# Patient Record
Sex: MALE | Race: WHITE | Employment: OTHER | URBAN - METROPOLITAN AREA
[De-identification: names, ages, dates, MRNs, and addresses within clinical notes are randomized per-mention and may not be internally consistent; named-entity substitution may affect disease eponyms.]

---

## 2020-02-18 ENCOUNTER — OFFICE VISIT (OUTPATIENT)
Dept: RHEUMATOLOGY | Age: 30
End: 2020-02-18

## 2020-02-18 VITALS
OXYGEN SATURATION: 97 % | BODY MASS INDEX: 24.26 KG/M2 | SYSTOLIC BLOOD PRESSURE: 130 MMHG | HEART RATE: 78 BPM | TEMPERATURE: 98.6 F | DIASTOLIC BLOOD PRESSURE: 72 MMHG | HEIGHT: 69 IN | WEIGHT: 163.8 LBS

## 2020-02-18 DIAGNOSIS — M05.9 SEROPOSITIVE RHEUMATOID ARTHRITIS (HCC): Primary | ICD-10-CM

## 2020-02-18 DIAGNOSIS — Z79.60 LONG-TERM USE OF IMMUNOSUPPRESSANT MEDICATION: ICD-10-CM

## 2020-02-18 DIAGNOSIS — M05.79 RHEUMATOID ARTHRITIS INVOLVING MULTIPLE SITES WITH POSITIVE RHEUMATOID FACTOR (HCC): ICD-10-CM

## 2020-02-18 RX ORDER — LANOLIN ALCOHOL/MO/W.PET/CERES
CREAM (GRAM) TOPICAL
COMMUNITY

## 2020-02-18 RX ORDER — PREDNISONE 10 MG/1
10 TABLET ORAL DAILY
Qty: 30 TAB | Refills: 1 | Status: SHIPPED | OUTPATIENT
Start: 2020-02-18 | End: 2020-03-31 | Stop reason: SDUPTHER

## 2020-02-18 NOTE — PROGRESS NOTES
REASON FOR VISIT    This is the initial evaluation for Mr. Vinny Granado a 26 y.o.  male for question of joint pains/positive RF. The patient is referred to the [facility] at the request of Dr. Brett Emerson. HISTORY OF PRESENT ILLNESS     Previous medical records reviewed and summarized: yes    MHAQ: 0.375  Pain scale: 8/10    This is a 26 y.o. male. Patient notes that 1.5 years ago he had pain in joints. About 9 months after that he went to a doctor who told her he had rheumatoid arthritis. He had joint pain in his knees, ankles, elbows, shoulders, fingers, hips, neck. The pain came on gradually. The pain come and goes. He doesn't think activity brings on the pain. He is very active but he doesn't always has pain. If the pain is going to come on then the mornings are worse. He was given prednisone and that helped with his symptoms. He wasn't using anything before. Before the prednisone, he had swelling. He has had PIP swelling which lasted for month. He used to get swelling in toes, wrists and it would last for few days then resolve on its own. Morning stiffness all the time. Prednisone didn't help with the stiffness. He is on prednisone 10 mg for 2.5 months. Since being on prednisone, he hasn't had joint pain or swelling. IN the morning he is very fatigued. NO skin rashes. No eye issues  No diarrhea, blood in stool      REVIEW OF SYSTEMS    A 15 point review of systems was performed and summarized below. The questionnaire was reviewed with the patient and scanned into the patient's medical record.     General: denies recent weight gain, recent weight loss, fatigue, weakness, fever, drenching night sweats  Musculoskeletal: endorses  joint pain, joint swelling, morning stiffness (lasting 3 hours),denies muscle pain  Ears: denies ringing in ears, hearing loss, deafness  Eyes: denies pain, light sensitive, redness, blindness, double vision, blurred vision, excess tearing, dryness, foreign body sensation  Mouth: denies sore tongue, oral ulcers, loss of taste, dryness, increased dental caries  Nose: denies nosebleeds, nasal ulcers  Throat: denies food stuck when swallowing, difficulty with swallowing, hoarseness, pain in jaw while chewing  Neck: denies swollen glands, tender glands  Cardiopulmonary: denies pain in chest with deep breaths, pain in chest when lying down, murmurs, sudden changes in heart beat, wheezing, dry cough, productive cough, shortness of breath at rest, shortness of breath on exertion, coughing of blood  Gastrointestinal: denies nausea, heartburn, stomach pain relieved by food, chronic constipation, chronic diarrhea, blood in stools, black stools  Genitourinary: denies vaginal dryness, pain or burning on urination, blood in urine, cloudy urine, vaginal ulcers, penile ulcers  Hematologic: denies anemia, bleeding tendency, blood clots, bleeding gums  Skin: denies easy bruising, hair loss, rash, rash worsened after sun exposure, hives/urticaria, skin thickening, skin tightness, nodules/bumps, color changes of hands or feet in the cold (Raynaud's)  Neurologic: denies numbness or tingling in hands, numbness or tingling in feet, muscle weakness  Psychiatric: denies depression, excessive worries, PTSD, Bipolar  Sleep: denies poor sleep (7 hours), denies snoring, apnea, daytime somnolence, difficulty falling asleep, difficulty staying asleep     PAST MEDICAL HISTORY    Past Medical History:   Diagnosis Date    Rheumatoid arthritis ([facility] [address]         Past Surgical History:   Procedure Laterality Date    HX SKIN BIOPSY      cyst from behind right ear       FAMILY HISTORY    Family History   Problem Relation Age of Onset    No Known Problems Mother     No Known Problems Father     No Known Problems Sister     No Known Problems Brother     No Known Problems Sister     No Known Problems Sister     No Known Problems Brother     No Known Problems Brother SOCIAL HISTORY    Social History     Tobacco Use    Smoking status: Heavy Tobacco Smoker     Packs/day: 1.00     Years: 8.00     Pack years: 8.00     Types: Cigarettes    Smokeless tobacco: Never Used   Substance Use Topics    Alcohol use: Yes     Comment: rarely    Drug use: Not Currently     Comment: Former heavy Cocaine use       MEDICATIONS    Current Outpatient Medications   Medication Sig Dispense Refill    melatonin 3 mg tablet Take  by mouth.  predniSONE (DELTASONE) 10 mg tablet Take 10 mg by mouth daily. 30 Tab 1       ALLERGIES    No Known Allergies    PHYSICAL EXAMINATION    Visit Vitals  /72 (BP 1 Location: Right arm, BP Patient Position: Sitting)   Pulse 78   Temp 98.6 °F (37 °C) (Oral)   Ht 5' 9\" (1.753 m)   Wt 163 lb 12.8 oz (74.3 kg)   SpO2 97%   BMI 24.19 kg/m²     Body mass index is 24.19 kg/m². General: NAD  HEENT: PERRL, anicteric, non-injected sclerae; oropharynx without ulcers, erythema, or exudate. Moist mucous membranes. Lymphatic: No cervical or axillary lymphadenopathy. Cardiovascular: S1, S2,no R/M/G  Pulmonary: CTA b/l. No wheezes/rales/rhonchi. Abdominal: Soft,NTND, + BS. Skin: No rash, nodules, or periungual changes. Neuro: Alert; able to carry normal conversation    Musculoskeletal:   No swollen joints    DATA REVIEW    Prior medical records were reviewed and if applicable are summarized as below:    Labs:   11/2019: Rf, CCP positive, uric acid normal, CRP, CK, Esr, JULI, lyme negative    Imaging:   N/A    ASSESSMENT AND PLAN    A 26 y.o. male presents for evaluation of episodic joint pain and swelling. Patient has positive serologies for rheumatoid arthritis. 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      # Seropositive rheumatoid arthritis:  - will do b/l hand and foot x-rays  - continue prednisone 10 mg oral daily for now  - once I have blood work results, will send in methotrexate. # Medication Toxicity Monitoring:  - cbc, cmp every 3 months  - Hepatitis B, C: Ordered today  - Quant gold: Ordered today  - Immunizations: We discussed receiving influenza, Prevar-13, and Pneumovax-23 vaccines as per the CDC guidelines for immunosuppressed patients. - bone health: Discuss at next visit    RTC in 6 weeks    The patient voiced understanding of the aforementioned assessment and plan. Summary of plan was provided in the After Visit Summary patient instructions. I also provided education about MyChart setup and utility. Mr. Ele Snyder has a reminder for a \"due or due soon\" health maintenance. I have asked that he contact his primary care provider for follow-up on this health maintenance.     TODAY'S ORDERS    Orders Placed This Encounter    QUANTIFERON-TB GOLD PLUS    XR HAND RT MIN 3 V    XR HAND LT MIN 3 V    XR FOOT LT MIN 3 V    XR FOOT RT MIN 3 V    CBC WITH AUTOMATED DIFF    METABOLIC PANEL, COMPREHENSIVE    CHRONIC HEPATITIS PANEL    melatonin 3 mg tablet    predniSONE (DELTASONE) 10 mg tablet       Future Appointments   Date Time Provider Danny Mann   3/31/2020  8:00 AM Tierra Arrington  Shauna Kenney MD    Adult Rheumatology   [facility]  A Part of [address], [address] [place] [address] Road   Phone [phone]  Fax [phone]

## 2020-02-18 NOTE — PATIENT INSTRUCTIONS
Please fill out your [address] you will receive after your visit in the mail or via [address]

## 2020-02-21 LAB
ALBUMIN SERPL-MCNC: 4.6 G/DL (ref 4.1–5.2)
ALBUMIN/GLOB SERPL: 2.6 {RATIO} (ref 1.2–2.2)
ALP SERPL-CCNC: 41 IU/L (ref 39–117)
ALT SERPL-CCNC: 12 IU/L (ref 0–44)
AST SERPL-CCNC: 12 IU/L (ref 0–40)
BASOPHILS # BLD AUTO: 0.1 X10E3/UL (ref 0–0.2)
BASOPHILS NFR BLD AUTO: 1 %
BILIRUB SERPL-MCNC: 0.6 MG/DL (ref 0–1.2)
BUN SERPL-MCNC: 6 MG/DL (ref 6–20)
BUN/CREAT SERPL: 7 (ref 9–20)
CALCIUM SERPL-MCNC: 9.1 MG/DL (ref 8.7–10.2)
CHLORIDE SERPL-SCNC: 101 MMOL/L (ref 96–106)
CO2 SERPL-SCNC: 25 MMOL/L (ref 20–29)
COMMENT, 144067: NORMAL
CREAT SERPL-MCNC: 0.83 MG/DL (ref 0.76–1.27)
EOSINOPHIL # BLD AUTO: 0 X10E3/UL (ref 0–0.4)
EOSINOPHIL NFR BLD AUTO: 1 %
ERYTHROCYTE [DISTWIDTH] IN BLOOD BY AUTOMATED COUNT: 13 % (ref 11.6–15.4)
GAMMA INTERFERON BACKGROUND BLD IA-ACNC: 0.01 IU/ML
GLOBULIN SER CALC-MCNC: 1.8 G/DL (ref 1.5–4.5)
GLUCOSE SERPL-MCNC: 96 MG/DL (ref 65–99)
HBV CORE AB SERPL QL IA: NEGATIVE
HBV CORE IGM SERPL QL IA: NEGATIVE
HBV E AB SERPL QL IA: NEGATIVE
HBV E AG SERPL QL IA: NEGATIVE
HBV SURFACE AB SER QL: NON REACTIVE
HBV SURFACE AG SERPL QL IA: NEGATIVE
HCT VFR BLD AUTO: 39.6 % (ref 37.5–51)
HCV AB S/CO SERPL IA: <0.1 S/CO RATIO (ref 0–0.9)
HGB BLD-MCNC: 14.5 G/DL (ref 13–17.7)
IMM GRANULOCYTES # BLD AUTO: 0 X10E3/UL (ref 0–0.1)
IMM GRANULOCYTES NFR BLD AUTO: 0 %
LYMPHOCYTES # BLD AUTO: 0.8 X10E3/UL (ref 0.7–3.1)
LYMPHOCYTES NFR BLD AUTO: 13 %
M TB IFN-G BLD-IMP: NEGATIVE
M TB IFN-G CD4+ BCKGRND COR BLD-ACNC: 0.02 IU/ML
MCH RBC QN AUTO: 32.3 PG (ref 26.6–33)
MCHC RBC AUTO-ENTMCNC: 36.6 G/DL (ref 31.5–35.7)
MCV RBC AUTO: 88 FL (ref 79–97)
MITOGEN IGNF BLD-ACNC: 4.01 IU/ML
MONOCYTES # BLD AUTO: 0.4 X10E3/UL (ref 0.1–0.9)
MONOCYTES NFR BLD AUTO: 6 %
MORPHOLOGY BLD-IMP: ABNORMAL
NEUTROPHILS # BLD AUTO: 5 X10E3/UL (ref 1.4–7)
NEUTROPHILS NFR BLD AUTO: 79 %
PLATELET # BLD AUTO: 198 X10E3/UL (ref 150–450)
POTASSIUM SERPL-SCNC: 4.7 MMOL/L (ref 3.5–5.2)
PROT SERPL-MCNC: 6.4 G/DL (ref 6–8.5)
QUANTIFERON INCUBATION, QF1T: NORMAL
QUANTIFERON TB2 AG: 0.01 IU/ML
RBC # BLD AUTO: 4.49 X10E6/UL (ref 4.14–5.8)
SERVICE CMNT-IMP: NORMAL
SODIUM SERPL-SCNC: 141 MMOL/L (ref 134–144)
WBC # BLD AUTO: 6.3 X10E3/UL (ref 3.4–10.8)

## 2020-02-24 RX ORDER — METHOTREXATE 2.5 MG/1
15 TABLET ORAL
Qty: 30 TAB | Refills: 3 | Status: SHIPPED | OUTPATIENT
Start: 2020-02-29 | End: 2020-03-31 | Stop reason: SDUPTHER

## 2020-02-24 RX ORDER — FOLIC ACID 1 MG/1
1 TABLET ORAL DAILY
Qty: 90 TAB | Refills: 0 | Status: SHIPPED | OUTPATIENT
Start: 2020-02-24 | End: 2020-05-27 | Stop reason: SDUPTHER

## 2020-02-28 ENCOUNTER — TELEPHONE (OUTPATIENT)
Dept: RHEUMATOLOGY | Age: 30
End: 2020-02-28

## 2020-02-28 NOTE — TELEPHONE ENCOUNTER
Spoke with wife. Was concern that patient didn't have prednisone rx. . call placed to [address] [facility] states rx was there but it was too soon to fill. States patient could  later today or tomorrow. Wife notified.

## 2020-03-27 ENCOUNTER — PATIENT MESSAGE (OUTPATIENT)
Dept: RHEUMATOLOGY | Age: 30
End: 2020-03-27

## 2020-03-31 ENCOUNTER — VIRTUAL VISIT (OUTPATIENT)
Dept: RHEUMATOLOGY | Age: 30
End: 2020-03-31

## 2020-03-31 DIAGNOSIS — M05.79 RHEUMATOID ARTHRITIS INVOLVING MULTIPLE SITES WITH POSITIVE RHEUMATOID FACTOR (HCC): ICD-10-CM

## 2020-03-31 DIAGNOSIS — M05.9 SEROPOSITIVE RHEUMATOID ARTHRITIS (HCC): Primary | ICD-10-CM

## 2020-03-31 DIAGNOSIS — Z79.60 LONG-TERM USE OF IMMUNOSUPPRESSANT MEDICATION: ICD-10-CM

## 2020-03-31 RX ORDER — METHOTREXATE 2.5 MG/1
20 TABLET ORAL
Qty: 120 TAB | Refills: 0 | Status: SHIPPED | OUTPATIENT
Start: 2020-04-04 | End: 2020-07-13 | Stop reason: SDUPTHER

## 2020-03-31 RX ORDER — PREDNISONE 10 MG/1
10 TABLET ORAL DAILY
Qty: 60 TAB | Refills: 1 | Status: SHIPPED | OUTPATIENT
Start: 2020-03-31 | End: 2021-02-16 | Stop reason: ALTCHOICE

## 2020-03-31 NOTE — PATIENT INSTRUCTIONS
Please fill out your [address] you will receive after your visit in the mail or via [address]

## 2020-03-31 NOTE — PROGRESS NOTES
REASON FOR VISIT    Glenda Doss is a 26 y.o. male who was seen by synchronous (real-time) audio-video technology on 3/31/2020. HISTORY OF PRESENT ILLNESS     Previous medical records reviewed and summarized: yes    The patient notes he is doing well. He is still working as much from home as possible. The patient notes over the past month, he has had more flares. He has had pain in shoulders. Hands have been okay. His right hand flared up but not much. NO swelling. He is on prednisone 5 mg oral daily. He started the methotrexate. NO issues with taking it. He is doing 15 mg oral weekly with daily folic acid. He needs more prednisone. The prednisone helps with his symptoms. At first the prednisone helped a lot but he feels he is getting more flare ups.       REVIEW OF SYSTEMS    Positives as per history  Negatives as follows:  Teresa Suresh:    Denies unexplained persistent fevers, weight change, chronic fatigue  HEAD/EYES:              Denies eye redness, blurry vision or sudden loss of vision, dry eyes, HA, temporal artery pain  ENT:                            Denies oral/nasal ulcers, recurrent sinus infections, dry mouth  RESPIRATORY:         No pleuritic pain, history of pleural effusions, hemoptysis, exertional dyspnea  CARDIOVASCULAR:             Denies chest pain, history of pericardial effusions  GASTRO:                    Denies heartburn, esophageal dysmotility, abdominal pain, nausea, vomiting, diarrhea, blood in the stool  HEMATOLOGIC:        No easy bruising, purpura, swollen lymph nodes  SKIN:                           Denies alopecia, ulcers, nodules, sun sensitivity, unexplained persistent rash   VASCULAR:                Denies edema, cyanosis, raynaud phenomenon  NEUROLOGIC:           Denies specific muscle weakness, paresthesias   PSYCHIATRIC:           No sleep disturbance / snoring, depression, anxiety  MSK:                           No morning stiffness >1 hour, SI joint pain, persistent joint swelling, persistent joint pain      PAST MEDICAL HISTORY    Past Medical History:   Diagnosis Date    Rheumatoid arthritis ([address]         Past Surgical History:   Procedure Laterality Date    HX SKIN BIOPSY      cyst from behind right ear       FAMILY HISTORY    Family History   Problem Relation Age of Onset    No Known Problems Mother     No Known Problems Father     No Known Problems Sister     No Known Problems Brother     No Known Problems Sister     No Known Problems Sister     No Known Problems Brother     No Known Problems Brother        SOCIAL HISTORY    Social History     Tobacco Use    Smoking status: Heavy Tobacco Smoker     Packs/day: 1.00     Years: 8.00     Pack years: 8.00     Types: Cigarettes    Smokeless tobacco: Never Used   Substance Use Topics    Alcohol use: Yes     Comment: rarely    Drug use: Not Currently     Comment: Former heavy Cocaine use       MEDICATIONS    Current Outpatient Medications   Medication Sig Dispense Refill    [START ON 4/4/2020] methotrexate (RHEUMATREX) 2.5 mg tablet Take 8 Tabs by mouth Every Saturday. 120 Tab 0    predniSONE (DELTASONE) 10 mg tablet Take 10 mg by mouth daily. 60 Tab 1    folic acid (FOLVITE) 1 mg tablet Take 1 Tab by mouth daily. 90 Tab 0    melatonin 3 mg tablet Take  by mouth. ALLERGIES    No Known Allergies    PHYSICAL EXAMINATION    There were no vitals taken for this visit. There is no height or weight on file to calculate BMI. General: NAD  HEENT: PERRL, anicteric, non-injected sclerae; oropharynx without ulcers, erythema, or exudate. Moist mucous membranes. Lymphatic: No cervical or axillary lymphadenopathy. Cardiovascular: S1, S2,no R/M/G  Pulmonary: CTA b/l. No wheezes/rales/rhonchi. Abdominal: Soft,NTND, + BS. Skin: No rash, nodules, or periungual changes.   Neuro: Alert; able to carry normal conversation    Musculoskeletal:   Unable to assess    DATA REVIEW    Prior medical records were reviewed and if applicable are summarized as below:    Labs:   2/2020: Cbc, cmp unremarkable, Quant gold, HBV, HCV negative    11/2019: Rf, CCP positive, uric acid normal, CRP, CK, Esr, JULI, lyme negative    Imaging:   Hand x-rays (2/2020): normal  Foot x-rays (2/2020): normal    ASSESSMENT AND PLAN    A 26 y.o. male  With hx of seropositive rheumatoid arthritis presents for a follow up visit. He is tolerating methotrexate well and still has mildly active disease but is on prednisone. # Seropositive rheumatoid arthritis:  - continue prednisone 10 mg oral daily for now  - Increase methotrexate to 20 mg oral weekly with daily folic acid. # Medication Toxicity Monitoring:  - cbc, cmp in 2 months  - Hepatitis B, C: negative 2/2020  - Quant gold: negative 2/2020  - Immunizations: We discussed receiving influenza, Prevar-13, and Pneumovax-23 vaccines as per the CDC guidelines for immunosuppressed patients. - bone health: Discuss at next visit    RTC in 2 months    The patient voiced understanding of the aforementioned assessment and plan. Summary of plan was provided in the After Visit Summary patient instructions. I also provided education about MyChart setup and utility. Mr. Ioana Rodriguez has a reminder for a \"due or due soon\" health maintenance. I have asked that he contact his primary care provider for follow-up on this health maintenance. TODAY'S ORDERS    Orders Placed This Encounter    methotrexate (RHEUMATREX) 2.5 mg tablet    predniSONE (DELTASONE) 10 mg tablet       No future appointments. We discussed the expected course, resolution and complications of the diagnosis(es) in detail. Medication risks, benefits, costs, interactions, and alternatives were discussed as indicated. I advised him to contact the office if his condition worsens, changes or fails to improve as anticipated. He expressed understanding with the diagnosis(es) and plan.        Pursuant to the emergency declaration under the [facility], [facility] waiver authority and the [employer] and [employer] Act, this Virtual  Visit was conducted, with patient's consent, to reduce the patient's risk of exposure to COVID-19 and provide continuity of care for an established patient. Services were provided through a video synchronous discussion virtually to substitute for in-person clinic visit.     Danuta Holloway MD    Adult Rheumatology   [facility]  A Part of [facility], [address] [place] [address]   Phone [phone]  Fax [phone]

## 2020-05-27 ENCOUNTER — VIRTUAL VISIT (OUTPATIENT)
Dept: RHEUMATOLOGY | Age: 30
End: 2020-05-27

## 2020-05-27 DIAGNOSIS — Z79.60 LONG-TERM USE OF IMMUNOSUPPRESSANT MEDICATION: ICD-10-CM

## 2020-05-27 DIAGNOSIS — M05.9 SEROPOSITIVE RHEUMATOID ARTHRITIS (HCC): Primary | ICD-10-CM

## 2020-05-27 RX ORDER — FOLIC ACID 1 MG/1
1 TABLET ORAL DAILY
Qty: 90 TAB | Refills: 0 | Status: SHIPPED | OUTPATIENT
Start: 2020-05-27 | End: 2020-11-10 | Stop reason: SDUPTHER

## 2020-05-27 NOTE — PROGRESS NOTES
REASON FOR VISIT    Florinda Li is a 26 y.o. male who was seen by synchronous (real-time) audio-video technology on 5/27/2020. HISTORY OF PRESENT ILLNESS     Previous medical records reviewed and summarized: yes    The patient notes he is doing well. He notes his joints are doing well. He has no pain at all. He is on prednisone 10 mg oral daily. No issues with increasing the methoxate. No swelling or stiffness in the morning. He gets lower back stiffness if he doesn't go to the chiropractor.     ROS negative       REVIEW OF SYSTEMS    Positives as per history  Negatives as follows:  CONSTITUTlONAL:    Denies unexplained persistent fevers, weight change, chronic fatigue  HEAD/EYES:              Denies eye redness, blurry vision or sudden loss of vision, dry eyes, HA, temporal artery pain  ENT:                            Denies oral/nasal ulcers, recurrent sinus infections, dry mouth  RESPIRATORY:         No pleuritic pain, history of pleural effusions, hemoptysis, exertional dyspnea  CARDIOVASCULAR:             Denies chest pain, history of pericardial effusions  GASTRO:                    Denies heartburn, esophageal dysmotility, abdominal pain, nausea, vomiting, diarrhea, blood in the stool  HEMATOLOGIC:        No easy bruising, purpura, swollen lymph nodes  SKIN:                           Denies alopecia, ulcers, nodules, sun sensitivity, unexplained persistent rash   VASCULAR:                Denies edema, cyanosis, raynaud phenomenon  NEUROLOGIC:           Denies specific muscle weakness, paresthesias   PSYCHIATRIC:           No sleep disturbance / snoring, depression, anxiety  MSK:                           No morning stiffness >1 hour, SI joint pain, persistent joint swelling, persistent joint pain      PAST MEDICAL HISTORY    Past Medical History:   Diagnosis Date    Rheumatoid arthritis ([address]         Past Surgical History:   Procedure Laterality Date    HX SKIN BIOPSY      cyst from behind right ear       FAMILY HISTORY    Family History   Problem Relation Age of Onset    No Known Problems Mother     No Known Problems Father     No Known Problems Sister     No Known Problems Brother     No Known Problems Sister     No Known Problems Sister     No Known Problems Brother     No Known Problems Brother        SOCIAL HISTORY    Social History     Tobacco Use    Smoking status: Heavy Tobacco Smoker     Packs/day: 1.00     Years: 8.00     Pack years: 8.00     Types: Cigarettes    Smokeless tobacco: Never Used   Substance Use Topics    Alcohol use: Yes     Comment: rarely    Drug use: Not Currently     Comment: Former heavy Cocaine use       MEDICATIONS    Current Outpatient Medications   Medication Sig Dispense Refill    methotrexate (RHEUMATREX) 2.5 mg tablet Take 8 Tabs by mouth Every Saturday. 120 Tab 0    predniSONE (DELTASONE) 10 mg tablet Take 10 mg by mouth daily. 60 Tab 1    folic acid (FOLVITE) 1 mg tablet Take 1 Tab by mouth daily. 90 Tab 0    melatonin 3 mg tablet Take  by mouth. ALLERGIES    No Known Allergies    PHYSICAL EXAMINATION    There were no vitals taken for this visit. There is no height or weight on file to calculate BMI. General: NAD  HEENT: PERRL, anicteric, non-injected sclerae; oropharynx without ulcers, erythema, or exudate. Moist mucous membranes. Lymphatic: No cervical or axillary lymphadenopathy. Cardiovascular: S1, S2,no R/M/G  Pulmonary: CTA b/l. No wheezes/rales/rhonchi. Abdominal: Soft,NTND, + BS. Skin: No rash, nodules, or periungual changes.   Neuro: Alert; able to carry normal conversation    Musculoskeletal:   Hands grossly normal with no swelling    DATA REVIEW    Prior medical records were reviewed and if applicable are summarized as below:    Labs:   2/2020: Cbc, cmp unremarkable, Quant gold, HBV, HCV negative    11/2019: Rf, CCP positive, uric acid normal, CRP, CK, Esr, JULI, lyme negative    Imaging:   Hand x-rays (2/2020): normal  Foot x-rays (2/2020): normal    ASSESSMENT AND PLAN    A 26 y.o. male  With hx of seropositive rheumatoid arthritis on methotrexate 15 mg oral weekly with daily folic acid, prednisone 10 mg oral daily presents for a virtual visit. The patient is doing well with low disease activity. # Seropositive rheumatoid arthritis:  - decrease prednisone to 5 mg daily for 20 days then stop  - Continue methotrexate to 20 mg oral weekly with daily folic acid. # Medication Toxicity Monitoring:  - cbc, cmp in the future  - Hepatitis B, C: negative 2/2020  - Quant gold: negative 2/2020  - Immunizations: We discussed receiving influenza, Prevar-13, and Pneumovax-23 vaccines as per the CDC guidelines for immunosuppressed patients. RTC in 3 months    The patient voiced understanding of the aforementioned assessment and plan. Summary of plan was provided in the After Visit Summary patient instructions. I also provided education about MyChart setup and utility. Mr. Eduar Oliva has a reminder for a \"due or due soon\" health maintenance. I have asked that he contact his primary care provider for follow-up on this health maintenance. TODAY'S ORDERS    No orders of the defined types were placed in this encounter. No future appointments. We discussed the expected course, resolution and complications of the diagnosis(es) in detail. Medication risks, benefits, costs, interactions, and alternatives were discussed as indicated. I advised him to contact the office if his condition worsens, changes or fails to improve as anticipated. He expressed understanding with the diagnosis(es) and plan.        Pursuant to the emergency declaration under the [facility] waiver authority and the [employer] and [employer] Act, this Virtual  Visit was conducted, with patient's consent, to reduce the patient's risk of exposure to COVID-19 and provide continuity of care for an established patient. Services were provided through a video synchronous discussion virtually to substitute for in-person clinic visit.     Meño Pierre MD    Adult Rheumatology   [facility]  A Part of [facility], [address] [place] [address] Road   Phone [phone]  Fax [phone]

## 2020-05-27 NOTE — PATIENT INSTRUCTIONS
Please fill out your [address] you will receive after your visit in the mail or via [address]

## 2020-07-13 RX ORDER — METHOTREXATE 2.5 MG/1
20 TABLET ORAL
Qty: 40 TAB | Refills: 0 | Status: SHIPPED | OUTPATIENT
Start: 2020-07-18 | End: 2020-07-27 | Stop reason: SDUPTHER

## 2020-07-28 RX ORDER — METHOTREXATE 2.5 MG/1
20 TABLET ORAL
Qty: 40 TAB | Refills: 1 | Status: SHIPPED | OUTPATIENT
Start: 2020-08-01 | End: 2020-11-10 | Stop reason: SDUPTHER

## 2020-09-08 RX ORDER — METHOTREXATE 2.5 MG/1
20 TABLET ORAL
Qty: 40 TAB | Refills: 1 | OUTPATIENT
Start: 2020-09-12

## 2020-09-10 RX ORDER — METHOTREXATE 2.5 MG/1
20 TABLET ORAL
Qty: 28 TAB | Refills: 0 | Status: CANCELLED | OUTPATIENT
Start: 2020-09-12 | End: 2020-09-26

## 2020-09-11 ENCOUNTER — TELEPHONE (OUTPATIENT)
Dept: RHEUMATOLOGY | Age: 30
End: 2020-09-11

## 2020-09-11 RX ORDER — METHOTREXATE 2.5 MG/1
20 TABLET ORAL
Qty: 40 TAB | Refills: 1 | OUTPATIENT
Start: 2020-09-12

## 2020-09-11 RX ORDER — FOLIC ACID 1 MG/1
1 TABLET ORAL DAILY
Qty: 90 TAB | Refills: 0 | OUTPATIENT
Start: 2020-09-11

## 2020-09-11 NOTE — TELEPHONE ENCOUNTER
Can you please enter order for labs so that the PT can have them done before seeing the new physcian in November please?  Thank you Mary Roberts

## 2020-09-25 DIAGNOSIS — M05.9 SEROPOSITIVE RHEUMATOID ARTHRITIS (HCC): Primary | ICD-10-CM

## 2020-09-25 DIAGNOSIS — Z79.60 LONG-TERM USE OF IMMUNOSUPPRESSANT MEDICATION: ICD-10-CM

## 2020-09-25 NOTE — TELEPHONE ENCOUNTER
Pt is calling for refill and was told an appt was needed. Pt has scheduled an appt with Dr. Ángel Cadena and now being told he needs labs.  No labs has been ordered and the patient is out of his medication

## 2020-09-25 NOTE — TELEPHONE ENCOUNTER
----- Message from Samia Young LPN sent at 8/08/0479  1:11 PM EDT -----  Regarding: FW: Dr. Val Raya    ----- Message -----  From: Gregory Lindsey  Sent: 9/22/2020  11:59 AM EDT  To: [facility] Nurse Pool  Subject: Dr. Giuliana Monreal (if not patient): Day Emminizer       Relationship of caller (if not patient): Spouse      Best contact number(s):   [phone]      Name of medication and dosage if known: Methotrexate, Folic Acid      Is patient out of this medication (yes/no):  Yes      Pharmacy name: [address] [place] [address]/[facility] listed in chart? (yes/no): Yes  Pharmacy phone number:      Details to clarify the request:  Former patient if Dr. Luba Delvalle. Patient is out of his medication and has an appointment scheduled on  Monday 11/02/2020 with Dr. Michael Cheng.  Patient had discussed the refill with Dr. Roderick Sin before and the request from the pharmacy was denied      Dalia Jane

## 2020-09-27 RX ORDER — METHOTREXATE 2.5 MG/1
20 TABLET ORAL
Qty: 40 TAB | Refills: 1 | OUTPATIENT
Start: 2020-10-03

## 2020-09-27 RX ORDER — FOLIC ACID 1 MG/1
1 TABLET ORAL DAILY
Qty: 90 TAB | Refills: 0 | OUTPATIENT
Start: 2020-09-27

## 2020-09-30 ENCOUNTER — TRANSCRIBE ORDER (OUTPATIENT)
Dept: INTERNAL MEDICINE CLINIC | Age: 30
End: 2020-09-30

## 2020-09-30 ENCOUNTER — TELEPHONE (OUTPATIENT)
Dept: RHEUMATOLOGY | Age: 30
End: 2020-09-30

## 2020-09-30 NOTE — TELEPHONE ENCOUNTER
----- Message from Woodward Dakins sent at 9/30/2020  2:47 PM EDT -----  Regarding: Dr. Louis Neal is requesting a call back to discuss the pt's \"Methotrexate\" medication. She states that the pt is completely out and that his appt to be seen isn't until Nov. She request for a refill before then but says that it keeps being denied. Best contact number is [phone].

## 2020-10-01 ENCOUNTER — DOCUMENTATION ONLY (OUTPATIENT)
Dept: RHEUMATOLOGY | Age: 30
End: 2020-10-01

## 2020-10-01 NOTE — TELEPHONE ENCOUNTER
Pt has been requesting a refill for methotrexate and folic acid since 3/3/59. The request was denied by Dr. Jarrett Babin because there was no labs or recent appt. Per [place],  documentation Appt was made on 9/10/20 with Dr. Aspen Ann for 11/2/20. Since then there have been several request made for refills and labs. The patient is now completely out of his medication. Please put in lab orders and refills for this patient.

## 2020-10-22 LAB
ALBUMIN SERPL-MCNC: 4.6 G/DL (ref 4–5)
ALBUMIN/GLOB SERPL: 2.3 {RATIO} (ref 1.2–2.2)
ALP SERPL-CCNC: 48 IU/L (ref 39–117)
ALT SERPL-CCNC: 15 IU/L (ref 0–44)
AST SERPL-CCNC: 16 IU/L (ref 0–40)
BASOPHILS # BLD AUTO: 0.1 X10E3/UL (ref 0–0.2)
BASOPHILS NFR BLD AUTO: 2 %
BILIRUB SERPL-MCNC: 0.5 MG/DL (ref 0–1.2)
BUN SERPL-MCNC: 11 MG/DL (ref 6–20)
BUN/CREAT SERPL: 12 (ref 9–20)
CALCIUM SERPL-MCNC: 9.4 MG/DL (ref 8.7–10.2)
CHLORIDE SERPL-SCNC: 104 MMOL/L (ref 96–106)
CO2 SERPL-SCNC: 28 MMOL/L (ref 20–29)
CREAT SERPL-MCNC: 0.93 MG/DL (ref 0.76–1.27)
EOSINOPHIL # BLD AUTO: 0.2 X10E3/UL (ref 0–0.4)
EOSINOPHIL NFR BLD AUTO: 3 %
ERYTHROCYTE [DISTWIDTH] IN BLOOD BY AUTOMATED COUNT: 12.7 % (ref 11.6–15.4)
GLOBULIN SER CALC-MCNC: 2 G/DL (ref 1.5–4.5)
GLUCOSE SERPL-MCNC: 108 MG/DL (ref 65–99)
HCT VFR BLD AUTO: 41.9 % (ref 37.5–51)
HGB BLD-MCNC: 14.7 G/DL (ref 13–17.7)
IMM GRANULOCYTES # BLD AUTO: 0 X10E3/UL (ref 0–0.1)
IMM GRANULOCYTES NFR BLD AUTO: 0 %
LYMPHOCYTES # BLD AUTO: 1.5 X10E3/UL (ref 0.7–3.1)
LYMPHOCYTES NFR BLD AUTO: 33 %
MCH RBC QN AUTO: 31.9 PG (ref 26.6–33)
MCHC RBC AUTO-ENTMCNC: 35.1 G/DL (ref 31.5–35.7)
MCV RBC AUTO: 91 FL (ref 79–97)
MONOCYTES # BLD AUTO: 0.5 X10E3/UL (ref 0.1–0.9)
MONOCYTES NFR BLD AUTO: 11 %
NEUTROPHILS # BLD AUTO: 2.3 X10E3/UL (ref 1.4–7)
NEUTROPHILS NFR BLD AUTO: 51 %
PLATELET # BLD AUTO: 204 X10E3/UL (ref 150–450)
POTASSIUM SERPL-SCNC: 4.6 MMOL/L (ref 3.5–5.2)
PROT SERPL-MCNC: 6.6 G/DL (ref 6–8.5)
RBC # BLD AUTO: 4.61 X10E6/UL (ref 4.14–5.8)
SODIUM SERPL-SCNC: 142 MMOL/L (ref 134–144)
WBC # BLD AUTO: 4.5 X10E3/UL (ref 3.4–10.8)

## 2020-10-27 NOTE — TELEPHONE ENCOUNTER
Pt's wife notified labs are normal and appt has been change from Dr. Carly Peters to Dr. Otoniel Foss Same day and time.

## 2020-11-10 RX ORDER — METHOTREXATE 2.5 MG/1
20 TABLET ORAL
Qty: 40 TAB | Refills: 1 | Status: SHIPPED | OUTPATIENT
Start: 2020-11-14 | End: 2021-02-16 | Stop reason: SDUPTHER

## 2020-11-10 RX ORDER — FOLIC ACID 1 MG/1
1 TABLET ORAL DAILY
Qty: 90 TAB | Refills: 0 | Status: SHIPPED | OUTPATIENT
Start: 2020-11-10 | End: 2021-06-17 | Stop reason: SDUPTHER

## 2020-11-10 NOTE — TELEPHONE ENCOUNTER
----- Message from Debra Arellano sent at 11/9/2020  4:08 PM EST -----  Regarding: FW: Dr. Nazia Polanco    ----- Message -----  From: Jaja Moreno  Sent: 11/9/2020   3:54 PM EST  To: [facility] Office Pool  Subject: Dr. Buster Bentley request for a refill of his Folic Acid and Methotrexate medication. Best contact number is [phone].

## 2020-11-18 ENCOUNTER — OFFICE VISIT (OUTPATIENT)
Dept: RHEUMATOLOGY | Age: 30
End: 2020-11-18
Payer: COMMERCIAL

## 2020-11-18 VITALS
WEIGHT: 166.8 LBS | RESPIRATION RATE: 18 BRPM | OXYGEN SATURATION: 98 % | TEMPERATURE: 97.5 F | SYSTOLIC BLOOD PRESSURE: 127 MMHG | HEART RATE: 74 BPM | DIASTOLIC BLOOD PRESSURE: 78 MMHG | HEIGHT: 69 IN | BODY MASS INDEX: 24.71 KG/M2

## 2020-11-18 DIAGNOSIS — Z79.899 ONGOING USE OF POSSIBLY TOXIC MEDICATION: ICD-10-CM

## 2020-11-18 DIAGNOSIS — M05.9 SEROPOSITIVE RHEUMATOID ARTHRITIS (HCC): Primary | ICD-10-CM

## 2020-11-18 PROCEDURE — 99215 OFFICE O/P EST HI 40 MIN: CPT | Performed by: INTERNAL MEDICINE

## 2020-11-18 NOTE — PROGRESS NOTES
Chief Complaint   Patient presents with    Arthritis     shoulder     1. Have you been to the ER, urgent care clinic since your last visit? Hospitalized since your last visit? No    2. Have you seen or consulted any other health care providers outside of the [address] [place] [address] since your last visit? Include any pap smears or colon screening.  No

## 2020-11-18 NOTE — PATIENT INSTRUCTIONS
1. Take 6 pills of methotrexate now, then this weekend resume your usual 8 pill dose every 7 days. 2. Continue folic acid 1mg daily. 3. Labs every 3 months from [facility] (Jan/Feb next) 4. If you're feeling good any time before next visit, OK to try reducing your weekly methotrexate to 6 pills (15mg) once a week. 5. Return in 4-6 months.

## 2020-11-18 NOTE — PROGRESS NOTES
REASON FOR VISIT    Sunday Aldrich is a 31 y.o. male who was seen in-office on 11/18/2020. 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 Jeanette Rea continues to do well. Had been spacing out methotrexate doses last 2 months when had to update blood work and then traveled to [facility]. Was having more right shoulder pain, took few ibuprofen last night and now feels normal again. No interval joint swelling, overall feels good since starting methotrexate. Business going well   No interval infections  No breathing or eye complaints.         REVIEW OF SYSTEMS    Positives as per history  Negatives as follows:  [place]:    Denies unexplained persistent fevers, weight change, chronic fatigue  HEAD/EYES:              Denies eye redness, blurry vision or sudden loss of vision, dry eyes, HA, temporal artery pain  ENT:                            Denies oral/nasal ulcers, recurrent sinus infections, dry mouth  RESPIRATORY:         No pleuritic pain, history of pleural effusions, hemoptysis, exertional dyspnea  CARDIOVASCULAR:             Denies chest pain, history of pericardial effusions  GASTRO:                    Denies heartburn, esophageal dysmotility, abdominal pain, nausea, vomiting, diarrhea, blood in the stool  HEMATOLOGIC:        No easy bruising, purpura, swollen lymph nodes  SKIN: Denies alopecia, ulcers, nodules, sun sensitivity, unexplained persistent rash   VASCULAR:                Denies edema, cyanosis, raynaud phenomenon  NEUROLOGIC:           Denies specific muscle weakness, paresthesias   PSYCHIATRIC:           No sleep disturbance / snoring, depression, anxiety  MSK:                           No morning stiffness >1 hour, SI joint pain, persistent joint swelling, persistent joint pain      PAST MEDICAL HISTORY    Past Medical History:   Diagnosis Date    Rheumatoid arthritis ([address]         Past Surgical History:   Procedure Laterality Date    HX SKIN BIOPSY      cyst from behind right ear       FAMILY HISTORY    Family History   Problem Relation Age of Onset    No Known Problems Mother     No Known Problems Father     No Known Problems Sister     No Known Problems Brother     No Known Problems Sister     No Known Problems Sister     No Known Problems Brother     No Known Problems Brother        SOCIAL HISTORY    Social History     Tobacco Use    Smoking status: Heavy Tobacco Smoker     Packs/day: 1.00     Years: 8.00     Pack years: 8.00     Types: Cigarettes    Smokeless tobacco: Never Used   Substance Use Topics    Alcohol use: Yes     Comment: rarely    Drug use: Not Currently     Comment: Former heavy Cocaine use       MEDICATIONS    Current Outpatient Medications   Medication Sig Dispense Refill    methotrexate (RHEUMATREX) 2.5 mg tablet Take 8 Tabs by mouth Every Saturday. 40 Tab 1    folic acid (FOLVITE) 1 mg tablet Take 1 Tab by mouth daily. 90 Tab 0    predniSONE (DELTASONE) 10 mg tablet Take 10 mg by mouth daily. 60 Tab 1    melatonin 3 mg tablet Take  by mouth.          ALLERGIES    No Known Allergies    PHYSICAL EXAMINATION    Visit Vitals  /78 (BP 1 Location: Left arm, BP Patient Position: Sitting)   Pulse 74   Temp 97.5 °F (36.4 °C) (Oral)   Resp 18   Ht 5' 9\" (1.753 m)   Wt 166 lb 12.8 oz (75.7 kg)   SpO2 98%   BMI 24.63 kg/m²     Body mass index is 24.63 kg/m². General:  The patient is well developed, well nourished, alert, and in no apparent distress. Eyes: Sclera are anicteric. No conjunctival injection. HEENT:  Oropharynx is clear. No oral ulcers. Adequate salivary pooling. No cervical or supraclavicular lymphadenopathy. Lungs:  Clear to auscultation bilaterally, without wheeze or stridor. Normal respiratory effort. Cor:  Regular rate and rhythm. No murmur rub or gallop. Abdomen: Soft, non-tender, without hepatomegaly or masses. Extremities: No calf tenderness or edema. Warm and well perfused. Skin:  No significant abnormalities. Neuro: Nonfocal  Musculoskeletal:    A comprehensive musculoskeletal exam was performed for all joints of each upper and lower extremity and assessed for swelling, tenderness and range of motion. Results are documented as below:  Mildly +empty can on right, negative Neer. No warmth or swelling. Mild Flako node right 4th PIP. No evidence of synovitis in the small joints of the hands, wrists, shoulders, elbows, hips, knees or ankles. DATA REVIEW    Prior medical records were reviewed and if applicable are summarized as below:    Labs:   10/2020: CBC, CMP WNL  2/2020: Cbc, cmp unremarkable, Quant gold, HBV, HCV negative  11/2019: Rf, CCP positive, uric acid normal, CRP, CK, Esr, JULI, lyme negative    Imaging:   Hand x-rays (2/2020): normal  Foot x-rays (2/2020): normal    ASSESSMENT AND PLAN    A 31 y.o. male with seropositive nonerosive rheumatoid arthritis on methotrexate 20 mg oral weekly with daily folic acid, in clinical remission off of prednisone. # Seropositive rheumatoid arthritis:  - Continue methotrexate to 20 mg oral weekly with daily folic acid. # Medication Toxicity Monitoring:  - cbc, cmp q3mo  - Hepatitis B, C: negative 2/2020  - Quant gold: negative 2/2020  - Immunizations:  We discussed receiving influenza, Prevar-13, and Pneumovax-23 vaccines as per the CDC guidelines for immunosuppressed patients. RTC in 4-6 months    The patient voiced understanding of the aforementioned assessment and plan. Summary of plan was provided in the After Visit Summary patient instructions. I also provided education about MyChart setup and utility. Mr. Kareen Vanegas has a reminder for a \"due or due soon\" health maintenance. I have asked that he contact his primary care provider for follow-up on this health maintenance. TODAY'S ORDERS    Orders Placed This Encounter    C REACTIVE PROTEIN, QT    CBC WITH AUTOMATED DIFF    METABOLIC PANEL, COMPREHENSIVE    SED RATE (ESR)       No future appointments. We discussed the expected course, resolution and complications of the diagnosis(es) in detail. Medication risks, benefits, costs, interactions, and alternatives were discussed as indicated. I advised him to contact the office if his condition worsens, changes or fails to improve as anticipated. He expressed understanding with the diagnosis(es) and plan.      Rosie Fenton MD    Adult Rheumatology   [facility]  A Part of [facility], [address]   Phone [phone]  Fax [phone]

## 2021-01-14 ENCOUNTER — TELEPHONE (OUTPATIENT)
Dept: RHEUMATOLOGY | Age: 31
End: 2021-01-14

## 2021-01-14 NOTE — TELEPHONE ENCOUNTER
----- Message from Nytrue[x] Mediaea Page sent at 1/14/2021  8:50 AM EST -----  Regarding: Dr. Gabrielle Tracey Message/Vendor Calls    Caller's first and last name: Arian Preston- Spouse      Reason for call: VV request      Callback required yes/no and why: Yes. To schedule      Best contact number(s): [phone]      Details to clarify the request: Patient is experiencing wrist pain and is working out of town in  [place].        Dalia Jane

## 2021-02-16 ENCOUNTER — OFFICE VISIT (OUTPATIENT)
Dept: RHEUMATOLOGY | Age: 31
End: 2021-02-16
Payer: COMMERCIAL

## 2021-02-16 VITALS
TEMPERATURE: 97 F | BODY MASS INDEX: 25.92 KG/M2 | DIASTOLIC BLOOD PRESSURE: 69 MMHG | SYSTOLIC BLOOD PRESSURE: 113 MMHG | HEIGHT: 69 IN | RESPIRATION RATE: 18 BRPM | OXYGEN SATURATION: 98 % | HEART RATE: 73 BPM | WEIGHT: 175 LBS

## 2021-02-16 DIAGNOSIS — Z79.899 ONGOING USE OF POSSIBLY TOXIC MEDICATION: ICD-10-CM

## 2021-02-16 DIAGNOSIS — M05.9 SEROPOSITIVE RHEUMATOID ARTHRITIS (HCC): Primary | ICD-10-CM

## 2021-02-16 PROCEDURE — 99215 OFFICE O/P EST HI 40 MIN: CPT | Performed by: INTERNAL MEDICINE

## 2021-02-16 RX ORDER — PREDNISONE 10 MG/1
TABLET ORAL
Qty: 12 TAB | Refills: 0 | Status: SHIPPED | OUTPATIENT
Start: 2021-02-16 | End: 2021-07-21 | Stop reason: SDUPTHER

## 2021-02-16 RX ORDER — METHOTREXATE 2.5 MG/1
20 TABLET ORAL
Qty: 40 TAB | Refills: 3 | Status: SHIPPED | OUTPATIENT
Start: 2021-02-20 | End: 2021-08-13 | Stop reason: SDUPTHER

## 2021-02-16 NOTE — PATIENT INSTRUCTIONS
1. Continue methotrexate 8 pills once a week. 2. I'm sending prednisone 30mg to take on days when you feel the arthritis coming on. Send me a message when you use it, so I have an idea how frequently you're needing this. If you're needing it regularly, we'll try to increase your immunosupppression. 3. Labs on your way out, and again in 3 months. 4. Return in 4-6 months, virtual visit is OK.

## 2021-02-16 NOTE — PROGRESS NOTES
REASON FOR VISIT    Evie Santiago is a 31 y.o. male who was seen in-office on 2/16/2021. 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 Admits more alcohol after the Holidays, though not particularly before his last flare. Early January, diagnosed with mild case of COVID, held methotrexate for about 2 weeks. Developed severe pain in bilateral wrists. Multiple flares of one wrist or the other, Couldn't open a door, turn a key. Typical pattern of onset, worsens through the first day until reaches a peak at night--usually then resolves spontaneously over the next 1-2 days. With the flare after COVID, was having stuttering flares for 2-3 weeks. MJ seems to help. Wife would like him to try gluten-free diet. No episodes of abdominal pain or rash. Still gets these flare-ups once every 1-3 months, when they occur he usually misses a night of sleep and the next day of work.     REVIEW OF SYSTEMS    Positives as per history  Negatives as follows:  [place]:    Denies unexplained persistent fevers, weight change, chronic fatigue  HEAD/EYES:              Denies eye redness, blurry vision or sudden loss of vision, dry eyes, HA, temporal artery pain  ENT:                            Denies oral/nasal ulcers, recurrent sinus infections, dry mouth  RESPIRATORY: No pleuritic pain, history of pleural effusions, hemoptysis, exertional dyspnea  CARDIOVASCULAR:             Denies chest pain, history of pericardial effusions  GASTRO:                    Denies heartburn, esophageal dysmotility, abdominal pain, nausea, vomiting, diarrhea, blood in the stool  HEMATOLOGIC:        No easy bruising, purpura, swollen lymph nodes  SKIN:                           Denies alopecia, ulcers, nodules, sun sensitivity, unexplained persistent rash   VASCULAR:                Denies edema, cyanosis, raynaud phenomenon  NEUROLOGIC:           Denies specific muscle weakness, paresthesias   PSYCHIATRIC:           No sleep disturbance / snoring, depression, anxiety  MSK:                           No morning stiffness >1 hour, SI joint pain, persistent joint swelling, persistent joint pain      PAST MEDICAL HISTORY    Past Medical History:   Diagnosis Date    Rhe[address] ([facility] [address]         Past Surgical History:   Procedure Laterality Date    HX SKIN BIOPSY      cyst from behind right ear       FAMILY HISTORY    Family History   Problem Relation Age of Onset    No Known Problems Mother     No Known Problems Father     No Known Problems Sister     No Known Problems Brother     No Known Problems Sister     No Known Problems Sister     No Known Problems Brother     No Known Problems Brother    Sister diagnosed with RA. Maternal GF has some type of autoimmune disease. SOCIAL HISTORY    Social History     Tobacco Use    Smoking status: Heavy Tobacco Smoker     Packs/day: 1.00     Years: 8.00     Pack years: 8.00     Types: Cigarettes    Smokeless tobacco: Never Used   Substance Use Topics    Alcohol use: Yes     Comment: rarely    Drug use: Not Currently     Comment: Former heavy Cocaine use       MEDICATIONS    Current Outpatient Medications   Medication Sig Dispense Refill    methotrexate (RHEUMATREX) 2.5 mg tablet Take 8 Tabs by mouth Every Saturday.  40 Tab 1    folic acid (FOLVITE) 1 mg tablet Take 1 Tab by mouth daily. 90 Tab 0    predniSONE (DELTASONE) 10 mg tablet Take 10 mg by mouth daily. 60 Tab 1    melatonin 3 mg tablet Take  by mouth. ALLERGIES    No Known Allergies    PHYSICAL EXAMINATION    Visit Vitals  /69 (BP 1 Location: Left upper arm, BP Patient Position: Sitting)   Pulse 73   Temp 97 °F (36.1 °C) (Oral)   Resp 18   Ht 5' 9\" (1.753 m)   Wt 175 lb (79.4 kg)   SpO2 98%   BMI 25.84 kg/m²     Body mass index is 25.84 kg/m². General:  The patient is well developed, well nourished, alert, and in no apparent distress. Eyes: Sclera are anicteric. No conjunctival injection. HEENT:  Oropharynx is clear. No oral ulcers. Adequate salivary pooling. No cervical or supraclavicular lymphadenopathy. Lungs:  Clear to auscultation bilaterally, without wheeze or stridor. Normal respiratory effort. Cor:  Regular rate and rhythm. No murmur rub or gallop. Abdomen: Soft, non-tender, without hepatomegaly or masses. Extremities: No calf tenderness or edema. Warm and well perfused. Skin:  No significant abnormalities. Tattoos without fading or nodularity. Neuro: Nonfocal, no foot or wrist drop. Musculoskeletal:    A comprehensive musculoskeletal exam was performed for all joints of each upper and lower extremity and assessed for swelling, tenderness and range of motion. Results are documented as below:  Bilateral shoulder crepitus with improved tenderness, no warmth or swelling. Mild Flako node right 4th PIP. Interval development of bilateral wrist tenderness, though no synovitis  No evidence of synovitis in the small joints of the hands, wrists, shoulders, elbows, hips, knees or ankles.          DATA REVIEW    Prior medical records were reviewed and if applicable are summarized as below:    Labs:   10/2020: CBC, CMP WNL  2/2020: Cbc, cmp unremarkable, Quant gold, HBV, HCV negative  11/2019: Rf, CCP positive, uric acid normal, CRP, CK, Esr, JULI, lyme negative    Imaging:   Hand x-rays (2/2020): normal  Foot x-rays (2/2020): normal    ASSESSMENT AND PLAN    A 31 y.o. male with seropositive nonerosive rheumatoid arthritis on methotrexate 20 mg oral weekly with daily folic acid, with recent increase in transient flaring arthritis particularly of the wrists after held methotrexate during COVID. Providing prednisone for 'pill-in-pocket' approach with early flares in the future, though if he's needing this more often than every 2-3 months, then will increase immunosuppression. # Seropositive rheumatoid arthritis:  - Continue methotrexate to 20 mg oral weekly with daily folic acid. - Also checking celiac antibodies, uric acid for atypical flaring pattern    # Medication Toxicity Monitoring:  - cbc, cmp q3mo  - Hepatitis B, C: negative 2/2020  - Quant gold: negative 2/2020  - Immunizations: We discussed receiving influenza, Prevar-13, and Pneumovax-23 vaccines as per the CDC guidelines for immunosuppressed patients. RTC in 4-6 months    The patient voiced understanding of the aforementioned assessment and plan. Summary of plan was provided in the After Visit Summary patient instructions. I also provided education about [employer] setup and utility. Mr. Mary Huff has a reminder for a \"due or due soon\" health maintenance. I have asked that he contact his primary care provider for follow-up on this health maintenance. TODAY'S ORDERS    Orders Placed This Encounter    CELIAC ANTIBODY PROFILE    C REACTIVE PROTEIN, QT    CBC WITH AUTOMATED DIFF    METABOLIC PANEL, COMPREHENSIVE    SED RATE (ESR)    URIC ACID    C REACTIVE PROTEIN, QT    CBC WITH AUTOMATED DIFF    METABOLIC PANEL, COMPREHENSIVE    SED RATE (ESR)    methotrexate (RHEUMATREX) 2.5 mg tablet    predniSONE (DELTASONE) 10 mg tablet       No future appointments. We discussed the expected course, resolution and complications of the diagnosis(es) in detail.   Medication risks, benefits, costs, interactions, and alternatives were discussed as indicated. I advised him to contact the office if his condition worsens, changes or fails to improve as anticipated. He expressed understanding with the diagnosis(es) and plan.      Olivia Mir MD    Adult Rheumatology   [facility]  A Part of [facility], [address] [place] [address] Road   Phone [phone]  Fax [phone]

## 2021-02-16 NOTE — PROGRESS NOTES
Chief Complaint   Patient presents with    Arthritis     1. Have you been to the ER, urgent care clinic since your last visit? Hospitalized since your last visit? No    2. Have you seen or consulted any other health care providers outside of the [address] [place] [address] since your last visit? Include any pap smears or colon screening.  No

## 2021-03-20 LAB
ALBUMIN SERPL-MCNC: 4.6 G/DL (ref 4–5)
ALBUMIN/GLOB SERPL: 1.9 {RATIO} (ref 1.2–2.2)
ALP SERPL-CCNC: 54 IU/L (ref 39–117)
ALT SERPL-CCNC: 13 IU/L (ref 0–44)
AST SERPL-CCNC: 14 IU/L (ref 0–40)
BASOPHILS # BLD AUTO: 0.1 X10E3/UL (ref 0–0.2)
BASOPHILS NFR BLD AUTO: 1 %
BILIRUB SERPL-MCNC: 0.6 MG/DL (ref 0–1.2)
BUN SERPL-MCNC: 17 MG/DL (ref 6–20)
BUN/CREAT SERPL: 17 (ref 9–20)
CALCIUM SERPL-MCNC: 9.4 MG/DL (ref 8.7–10.2)
CHLORIDE SERPL-SCNC: 101 MMOL/L (ref 96–106)
CO2 SERPL-SCNC: 24 MMOL/L (ref 20–29)
CREAT SERPL-MCNC: 0.99 MG/DL (ref 0.76–1.27)
CRP SERPL-MCNC: <1 MG/L (ref 0–10)
EOSINOPHIL # BLD AUTO: 0.2 X10E3/UL (ref 0–0.4)
EOSINOPHIL NFR BLD AUTO: 4 %
ERYTHROCYTE [DISTWIDTH] IN BLOOD BY AUTOMATED COUNT: 13.1 % (ref 11.6–15.4)
ERYTHROCYTE [SEDIMENTATION RATE] IN BLOOD BY WESTERGREN METHOD: 2 MM/HR (ref 0–15)
GLOBULIN SER CALC-MCNC: 2.4 G/DL (ref 1.5–4.5)
GLUCOSE SERPL-MCNC: 142 MG/DL (ref 65–99)
HCT VFR BLD AUTO: 43.6 % (ref 37.5–51)
HGB BLD-MCNC: 15.1 G/DL (ref 13–17.7)
IMM GRANULOCYTES # BLD AUTO: 0 X10E3/UL (ref 0–0.1)
IMM GRANULOCYTES NFR BLD AUTO: 0 %
LYMPHOCYTES # BLD AUTO: 1.5 X10E3/UL (ref 0.7–3.1)
LYMPHOCYTES NFR BLD AUTO: 33 %
MCH RBC QN AUTO: 31.1 PG (ref 26.6–33)
MCHC RBC AUTO-ENTMCNC: 34.6 G/DL (ref 31.5–35.7)
MCV RBC AUTO: 90 FL (ref 79–97)
MONOCYTES # BLD AUTO: 0.5 X10E3/UL (ref 0.1–0.9)
MONOCYTES NFR BLD AUTO: 12 %
NEUTROPHILS # BLD AUTO: 2.2 X10E3/UL (ref 1.4–7)
NEUTROPHILS NFR BLD AUTO: 50 %
PLATELET # BLD AUTO: 212 X10E3/UL (ref 150–450)
POTASSIUM SERPL-SCNC: 4.2 MMOL/L (ref 3.5–5.2)
PROT SERPL-MCNC: 7 G/DL (ref 6–8.5)
RBC # BLD AUTO: 4.86 X10E6/UL (ref 4.14–5.8)
SODIUM SERPL-SCNC: 139 MMOL/L (ref 134–144)
WBC # BLD AUTO: 4.4 X10E3/UL (ref 3.4–10.8)

## 2021-06-09 ENCOUNTER — PATIENT MESSAGE (OUTPATIENT)
Dept: RHEUMATOLOGY | Age: 31
End: 2021-06-09

## 2021-06-09 DIAGNOSIS — M05.9 SEROPOSITIVE RHEUMATOID ARTHRITIS (HCC): ICD-10-CM

## 2021-06-09 DIAGNOSIS — Z79.899 ONGOING USE OF POSSIBLY TOXIC MEDICATION: Primary | ICD-10-CM

## 2021-06-17 RX ORDER — FOLIC ACID 1 MG/1
2 TABLET ORAL DAILY
Qty: 180 TABLET | Refills: 1 | Status: SHIPPED | OUTPATIENT
Start: 2021-06-17

## 2021-07-21 ENCOUNTER — OFFICE VISIT (OUTPATIENT)
Dept: RHEUMATOLOGY | Age: 31
End: 2021-07-21
Payer: COMMERCIAL

## 2021-07-21 VITALS
HEART RATE: 70 BPM | WEIGHT: 160.6 LBS | BODY MASS INDEX: 23.79 KG/M2 | RESPIRATION RATE: 18 BRPM | DIASTOLIC BLOOD PRESSURE: 69 MMHG | TEMPERATURE: 98.2 F | OXYGEN SATURATION: 98 % | SYSTOLIC BLOOD PRESSURE: 117 MMHG | HEIGHT: 69 IN

## 2021-07-21 DIAGNOSIS — Z79.899 ONGOING USE OF POSSIBLY TOXIC MEDICATION: ICD-10-CM

## 2021-07-21 DIAGNOSIS — M05.9 SEROPOSITIVE RHEUMATOID ARTHRITIS (HCC): ICD-10-CM

## 2021-07-21 PROCEDURE — 99214 OFFICE O/P EST MOD 30 MIN: CPT | Performed by: INTERNAL MEDICINE

## 2021-07-21 RX ORDER — PREDNISONE 10 MG/1
TABLET ORAL
Qty: 12 TABLET | Refills: 1 | Status: SHIPPED | OUTPATIENT
Start: 2021-07-21 | End: 2021-08-03 | Stop reason: SDUPTHER

## 2021-07-21 NOTE — PROGRESS NOTES
Chief Complaint   Patient presents with    Arthritis     elbow     1. Have you been to the ER, urgent care clinic since your last visit? Hospitalized since your last visit? No    2. Have you seen or consulted any other health care providers outside of the [address] [place] [address] since your last visit? Include any pap smears or colon screening.  No

## 2021-07-21 NOTE — PATIENT INSTRUCTIONS
1. No changes to methotrexate 33OY weekly, 1mg folic acid daily. 2. Prednisone refilled, try the 30mg once at the first sign of flare, oK to experiment a bit to determine what works best. No need to taper with short-term treatments. 3. Repeat labs ASAP, and then once more 3 months from now. 4. Call with any interval problems. 5. Return in 6 months.

## 2021-07-21 NOTE — PROGRESS NOTES
REASON FOR VISIT    Augusto Fontana is a 31 y.o. male who was seen in-office on 7/21/2021. 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 \" Tolerates 20mg weekly well. No significant alcohol. Has also been able to hold off on cigarettes, no cigarettes for the last 2 weeks, using patches now. Uric acid and celiac antibodies not drawn with last labs. Big job before July 4th, stress at work, needed prednisone over that time, off now for 2-3 weeks, pain was in both wrists, elbwos, shoulders, left CMC, resolves within 2-3 days of prednisone. No active contraception but no additional pregnancy to date (already had 3 girls 2,4,7yo).       REVIEW OF SYSTEMS    Positives as per history  Negatives as follows:  Fredda Douse:    Denies unexplained persistent fevers, weight change, chronic fatigue  HEAD/EYES:              Denies eye redness, blurry vision or sudden loss of vision, dry eyes, HA, temporal artery pain  ENT:                            Denies oral/nasal ulcers, recurrent sinus infections, dry mouth  RESPIRATORY:         No pleuritic pain, history of pleural effusions, hemoptysis, exertional dyspnea  CARDIOVASCULAR:             Denies chest pain, history of pericardial effusions  GASTRO:                    Denies heartburn, esophageal dysmotility, abdominal pain, nausea, vomiting, diarrhea, blood in the stool  HEMATOLOGIC:        No easy bruising, purpura, swollen lymph nodes  SKIN:                           Denies alopecia, ulcers, nodules, sun sensitivity, unexplained persistent rash   VASCULAR:                Denies edema, cyanosis, raynaud phenomenon  NEUROLOGIC:           Denies specific muscle weakness, paresthesias   PSYCHIATRIC:           No sleep disturbance / snoring, depression, anxiety  MSK:                           No morning stiffness >1 hour, SI joint pain, persistent joint swelling, persistent joint pain      PAST MEDICAL HISTORY    Past Medical History:   Diagnosis Date    Rheumatoid arthritis ([facility] [address]         Past Surgical History:   Procedure Laterality Date    HX SKIN BIOPSY      cyst from behind right ear       FAMILY HISTORY    Family History   Problem Relation Age of Onset    No Known Problems Mother     No Known Problems Father     No Known Problems Sister     No Known Problems Brother     No Known Problems Sister     No Known Problems Sister     No Known Problems Brother     No Known Problems Brother    Sister diagnosed with RA. Maternal GF has some type of autoimmune disease. SOCIAL HISTORY    Social History     Tobacco Use    Smoking status: Heavy Tobacco Smoker     Packs/day: 1.00     Years: 8.00     Pack years: 8.00     Types: Cigarettes    Smokeless tobacco: Never Used   Substance Use Topics    Alcohol use: Yes     Comment: rarely    Drug use: Not Currently     Comment: Former heavy Cocaine use       MEDICATIONS    Current Outpatient Medications   Medication Sig Dispense Refill    folic acid (FOLVITE) 1 mg tablet Take 2 Tablets by mouth daily. 180 Tablet 1    methotrexate (RHEUMATREX) 2.5 mg tablet Take 8 Tabs by mouth Every Saturday. 40 Tab 3    predniSONE (DELTASONE) 10 mg tablet Take 3 pills (30mg) once at the first sign of an arthritis flare.  Message to let us know how you do with this and how often this is happening. 12 Tab 0    melatonin 3 mg tablet Take  by mouth. ALLERGIES    No Known Allergies    PHYSICAL EXAMINATION    Visit Vitals  /69 (BP 1 Location: Left upper arm, BP Patient Position: Sitting)   Pulse 70   Temp 98.2 °F (36.8 °C) (Oral)   Resp 18   Ht 5' 9\" (1.753 m)   Wt 160 lb 9.6 oz (72.8 kg)   SpO2 98%   BMI 23.72 kg/m²     Body mass index is 23.72 kg/m². General:  The patient is well developed, well nourished, alert, and in no apparent distress. Eyes: Sclera are anicteric. No conjunctival injection. HEENT:  Oropharynx is clear. No oral ulcers. Adequate salivary pooling. No cervical or supraclavicular lymphadenopathy. Lungs:  Clear to auscultation bilaterally, without wheeze or stridor. Normal respiratory effort. Cor:  Regular rate and rhythm. No murmur rub or gallop. Abdomen: Soft, non-tender, without hepatomegaly or masses. Extremities: No calf tenderness or edema. Warm and well perfused. Skin:  No significant abnormalities. Tattoos without fading or nodularity. Neuro: Nonfocal, no foot or wrist drop. Musculoskeletal:    A comprehensive musculoskeletal exam was performed for all joints of each upper and lower extremity and assessed for swelling, tenderness and range of motion. Results are documented as below:  Bilateral shoulder crepitus with improved tenderness, no warmth or swelling. Mild Flako node right 4th PIP. Interval resolution of bilateral wrist tenderness, still no synovitis. No evidence of synovitis in the small joints of the hands, wrists, shoulders, elbows, hips, knees or ankles.          DATA REVIEW    Prior medical records were reviewed and if applicable are summarized as below:    Labs:   3/19/21: WBC 4.4, Hgb 15.1, Plt 212, ESR 2, Cr 0.99, LFTs WNL, CRP <1mg/L  10/2020: CBC, CMP WNL  2/2020: Cbc, cmp unremarkable, Quant gold, HBV, HCV negative  11/2019: Rf, CCP positive, uric acid normal, CRP, CK, Esr, JULI, lyme negative    Imaging:   Hand x-rays (2/2020): normal  Foot x-rays (2/2020): normal    ASSESSMENT AND PLAN  A 31 y.o. male with seropositive nonerosive rheumatoid arthritis on methotrexate 20 mg oral weekly with daily folic acid, with one flare of brief arthritis since last visit, today in clinical remission. # Seropositive rheumatoid arthritis:  - Continue methotrexate to 20 mg oral weekly with daily folic acid. - Reordered celiac antibodies, uric acid for atypical flaring pattern    # Medication Toxicity Monitoring:  - cbc, cmp q3mo  - Hepatitis B, C: negative 2/2020  - Quant gold: negative 2/2020  - Immunizations: We previously discussed receiving influenza, Prevar-13, and Pneumovax-23 vaccines as per the CDC guidelines for immunosuppressed patients. RTC in 6 months    The patient voiced understanding of the aforementioned assessment and plan. Summary of plan was provided in the After Visit Summary patient instructions. I also provided education about MyChart setup and utility. Mr. Rebeka Lilly has a reminder for a \"due or due soon\" health maintenance. I have asked that he contact his primary care provider for follow-up on this health maintenance. TODAY'S ORDERS    Orders Placed This Encounter    C REACTIVE PROTEIN, QT    CBC WITH AUTOMATED DIFF    METABOLIC PANEL, COMPREHENSIVE    SED RATE (ESR)    CELIAC ANTIBODY PROFILE    URIC ACID    C REACTIVE PROTEIN, QT    CBC WITH AUTOMATED DIFF    METABOLIC PANEL, COMPREHENSIVE    SED RATE (ESR)    predniSONE (DELTASONE) 10 mg tablet     Patient Instructions   1. No changes to methotrexate 94KF weekly, 1mg folic acid daily. 2. Prednisone refilled, try the 30mg once at the first sign of flare, oK to experiment a bit to determine what works best. No need to taper with short-term treatments. 3. Repeat labs ASAP, and then once more 3 months from now. 4. Call with any interval problems. 5. Return in 6 months.       Face to face time: 20 minutes  Note preparation and records review day of service: 15 minutes  Total provider time day of service: 35 minutes    No future appointments. We discussed the expected course, resolution and complications of the diagnosis(es) in detail. Medication risks, benefits, costs, interactions, and alternatives were discussed as indicated. I advised him to contact the office if his condition worsens, changes or fails to improve as anticipated. He expressed understanding with the diagnosis(es) and plan.      Leeanne Skaggs MD    Adult Rheumatology   [facility]  A Part of [facility], [address] [place] [address]   Phone [phone]  Fax [phone]

## 2021-07-29 LAB
ALBUMIN SERPL-MCNC: 4.9 G/DL (ref 4–5)
ALBUMIN/GLOB SERPL: 2.3 {RATIO} (ref 1.2–2.2)
ALP SERPL-CCNC: 60 IU/L (ref 48–121)
ALT SERPL-CCNC: 17 IU/L (ref 0–44)
AST SERPL-CCNC: 16 IU/L (ref 0–40)
BASOPHILS # BLD AUTO: 0.1 X10E3/UL (ref 0–0.2)
BASOPHILS NFR BLD AUTO: 1 %
BILIRUB SERPL-MCNC: 0.3 MG/DL (ref 0–1.2)
BUN SERPL-MCNC: 11 MG/DL (ref 6–20)
BUN/CREAT SERPL: 12 (ref 9–20)
CALCIUM SERPL-MCNC: 9.4 MG/DL (ref 8.7–10.2)
CHLORIDE SERPL-SCNC: 101 MMOL/L (ref 96–106)
CO2 SERPL-SCNC: 25 MMOL/L (ref 20–29)
CREAT SERPL-MCNC: 0.92 MG/DL (ref 0.76–1.27)
CRP SERPL-MCNC: <1 MG/L (ref 0–10)
EOSINOPHIL # BLD AUTO: 0.3 X10E3/UL (ref 0–0.4)
EOSINOPHIL NFR BLD AUTO: 4 %
ERYTHROCYTE [DISTWIDTH] IN BLOOD BY AUTOMATED COUNT: 13.1 % (ref 11.6–15.4)
ERYTHROCYTE [SEDIMENTATION RATE] IN BLOOD BY WESTERGREN METHOD: 2 MM/HR (ref 0–15)
GLIADIN PEPTIDE IGA SER-ACNC: 2 UNITS (ref 0–19)
GLIADIN PEPTIDE IGG SER-ACNC: 2 UNITS (ref 0–19)
GLOBULIN SER CALC-MCNC: 2.1 G/DL (ref 1.5–4.5)
GLUCOSE SERPL-MCNC: 101 MG/DL (ref 65–99)
HCT VFR BLD AUTO: 40.8 % (ref 37.5–51)
HGB BLD-MCNC: 14.7 G/DL (ref 13–17.7)
IGA SERPL-MCNC: 164 MG/DL (ref 90–386)
IMM GRANULOCYTES # BLD AUTO: 0 X10E3/UL (ref 0–0.1)
IMM GRANULOCYTES NFR BLD AUTO: 0 %
LYMPHOCYTES # BLD AUTO: 2.1 X10E3/UL (ref 0.7–3.1)
LYMPHOCYTES NFR BLD AUTO: 36 %
MCH RBC QN AUTO: 32.6 PG (ref 26.6–33)
MCHC RBC AUTO-ENTMCNC: 36 G/DL (ref 31.5–35.7)
MCV RBC AUTO: 91 FL (ref 79–97)
MONOCYTES # BLD AUTO: 0.6 X10E3/UL (ref 0.1–0.9)
MONOCYTES NFR BLD AUTO: 10 %
NEUTROPHILS # BLD AUTO: 2.8 X10E3/UL (ref 1.4–7)
NEUTROPHILS NFR BLD AUTO: 49 %
PLATELET # BLD AUTO: 208 X10E3/UL (ref 150–450)
POTASSIUM SERPL-SCNC: 4.3 MMOL/L (ref 3.5–5.2)
PROT SERPL-MCNC: 7 G/DL (ref 6–8.5)
RBC # BLD AUTO: 4.51 X10E6/UL (ref 4.14–5.8)
SODIUM SERPL-SCNC: 136 MMOL/L (ref 134–144)
TTG IGA SER-ACNC: <2 U/ML (ref 0–3)
TTG IGG SER-ACNC: <2 U/ML (ref 0–5)
URATE SERPL-MCNC: 5 MG/DL (ref 3.8–8.4)
WBC # BLD AUTO: 5.9 X10E3/UL (ref 3.4–10.8)

## 2021-08-13 DIAGNOSIS — M05.9 SEROPOSITIVE RHEUMATOID ARTHRITIS (HCC): ICD-10-CM

## 2021-08-13 RX ORDER — METHOTREXATE 2.5 MG/1
20 TABLET ORAL
Qty: 8 TABLET | Refills: 0 | Status: SHIPPED | OUTPATIENT
Start: 2021-08-14 | End: 2021-08-24

## 2021-08-13 RX ORDER — METHOTREXATE 2.5 MG/1
TABLET ORAL
Qty: 40 TABLET | Refills: 2 | Status: SHIPPED | OUTPATIENT
Start: 2021-08-13 | End: 2021-08-24 | Stop reason: SDUPTHER

## 2021-08-24 ENCOUNTER — PATIENT MESSAGE (OUTPATIENT)
Dept: RHEUMATOLOGY | Age: 31
End: 2021-08-24

## 2021-08-24 DIAGNOSIS — M05.9 SEROPOSITIVE RHEUMATOID ARTHRITIS (HCC): ICD-10-CM

## 2021-08-24 RX ORDER — METHOTREXATE 2.5 MG/1
20 TABLET ORAL
Qty: 40 TABLET | Refills: 2 | Status: SHIPPED | OUTPATIENT
Start: 2021-08-28

## 2021-10-13 LAB
ALBUMIN SERPL-MCNC: 4.5 G/DL (ref 4–5)
ALBUMIN/GLOB SERPL: 2.3 {RATIO} (ref 1.2–2.2)
ALP SERPL-CCNC: 54 IU/L (ref 44–121)
ALT SERPL-CCNC: 13 IU/L (ref 0–44)
AST SERPL-CCNC: 14 IU/L (ref 0–40)
BASOPHILS # BLD AUTO: 0.1 X10E3/UL (ref 0–0.2)
BASOPHILS NFR BLD AUTO: 1 %
BILIRUB SERPL-MCNC: 0.5 MG/DL (ref 0–1.2)
BUN SERPL-MCNC: 11 MG/DL (ref 6–20)
BUN/CREAT SERPL: 11 (ref 9–20)
CALCIUM SERPL-MCNC: 9.1 MG/DL (ref 8.7–10.2)
CHLORIDE SERPL-SCNC: 103 MMOL/L (ref 96–106)
CO2 SERPL-SCNC: 24 MMOL/L (ref 20–29)
CREAT SERPL-MCNC: 1.01 MG/DL (ref 0.76–1.27)
CRP SERPL-MCNC: <1 MG/L (ref 0–10)
EOSINOPHIL # BLD AUTO: 0.1 X10E3/UL (ref 0–0.4)
EOSINOPHIL NFR BLD AUTO: 2 %
ERYTHROCYTE [DISTWIDTH] IN BLOOD BY AUTOMATED COUNT: 12.7 % (ref 11.6–15.4)
ERYTHROCYTE [SEDIMENTATION RATE] IN BLOOD BY WESTERGREN METHOD: 2 MM/HR (ref 0–15)
GLOBULIN SER CALC-MCNC: 2 G/DL (ref 1.5–4.5)
GLUCOSE SERPL-MCNC: 91 MG/DL (ref 65–99)
HCT VFR BLD AUTO: 43 % (ref 37.5–51)
HGB BLD-MCNC: 14.8 G/DL (ref 13–17.7)
IMM GRANULOCYTES # BLD AUTO: 0 X10E3/UL (ref 0–0.1)
IMM GRANULOCYTES NFR BLD AUTO: 0 %
LYMPHOCYTES # BLD AUTO: 1.8 X10E3/UL (ref 0.7–3.1)
LYMPHOCYTES NFR BLD AUTO: 36 %
MCH RBC QN AUTO: 31.8 PG (ref 26.6–33)
MCHC RBC AUTO-ENTMCNC: 34.4 G/DL (ref 31.5–35.7)
MCV RBC AUTO: 92 FL (ref 79–97)
MONOCYTES # BLD AUTO: 0.5 X10E3/UL (ref 0.1–0.9)
MONOCYTES NFR BLD AUTO: 10 %
NEUTROPHILS # BLD AUTO: 2.5 X10E3/UL (ref 1.4–7)
NEUTROPHILS NFR BLD AUTO: 51 %
PLATELET # BLD AUTO: 217 X10E3/UL (ref 150–450)
POTASSIUM SERPL-SCNC: 4.8 MMOL/L (ref 3.5–5.2)
PROT SERPL-MCNC: 6.5 G/DL (ref 6–8.5)
RBC # BLD AUTO: 4.66 X10E6/UL (ref 4.14–5.8)
SODIUM SERPL-SCNC: 137 MMOL/L (ref 134–144)
WBC # BLD AUTO: 4.9 X10E3/UL (ref 3.4–10.8)

## 2023-05-12 ENCOUNTER — OFFICE VISIT (OUTPATIENT)
Dept: FAMILY MEDICINE CLINIC | Age: 33
End: 2023-05-12

## 2023-05-12 VITALS
RESPIRATION RATE: 16 BRPM | HEIGHT: 69 IN | BODY MASS INDEX: 22.96 KG/M2 | HEART RATE: 90 BPM | OXYGEN SATURATION: 97 % | DIASTOLIC BLOOD PRESSURE: 74 MMHG | TEMPERATURE: 98.2 F | SYSTOLIC BLOOD PRESSURE: 131 MMHG | WEIGHT: 155 LBS

## 2023-05-12 DIAGNOSIS — Z13.220 LIPID SCREENING: ICD-10-CM

## 2023-05-12 DIAGNOSIS — Z00.00 ENCOUNTER FOR HEALTH MAINTENANCE EXAMINATION IN ADULT: ICD-10-CM

## 2023-05-12 DIAGNOSIS — M05.732 RHEUMATOID ARTHRITIS INVOLVING BOTH WRISTS WITH POSITIVE RHEUMATOID FACTOR (HCC): Primary | ICD-10-CM

## 2023-05-12 DIAGNOSIS — M05.731 RHEUMATOID ARTHRITIS INVOLVING BOTH WRISTS WITH POSITIVE RHEUMATOID FACTOR (HCC): Primary | ICD-10-CM

## 2023-05-12 DIAGNOSIS — B07.0 PLANTAR WART OF RIGHT FOOT: ICD-10-CM

## 2023-05-12 LAB
ALBUMIN SERPL-MCNC: 4.3 G/DL (ref 3.5–5)
ALBUMIN/GLOB SERPL: 1.5 (ref 1.1–2.2)
ALP SERPL-CCNC: 51 U/L (ref 45–117)
ALT SERPL-CCNC: 42 U/L (ref 12–78)
ANION GAP SERPL CALC-SCNC: 2 MMOL/L (ref 5–15)
AST SERPL-CCNC: 20 U/L (ref 15–37)
BASOPHILS # BLD: 0.1 K/UL (ref 0–0.1)
BASOPHILS NFR BLD: 1 % (ref 0–1)
BILIRUB SERPL-MCNC: 0.3 MG/DL (ref 0.2–1)
BUN SERPL-MCNC: 17 MG/DL (ref 6–20)
BUN/CREAT SERPL: 20 (ref 12–20)
CALCIUM SERPL-MCNC: 9.2 MG/DL (ref 8.5–10.1)
CHLORIDE SERPL-SCNC: 106 MMOL/L (ref 97–108)
CHOLEST SERPL-MCNC: 141 MG/DL
CO2 SERPL-SCNC: 28 MMOL/L (ref 21–32)
CREAT SERPL-MCNC: 0.87 MG/DL (ref 0.7–1.3)
DIFFERENTIAL METHOD BLD: NORMAL
EOSINOPHIL # BLD: 0.1 K/UL (ref 0–0.4)
EOSINOPHIL NFR BLD: 1 % (ref 0–7)
ERYTHROCYTE [DISTWIDTH] IN BLOOD BY AUTOMATED COUNT: 12.9 % (ref 11.5–14.5)
ERYTHROCYTE [SEDIMENTATION RATE] IN BLOOD: 6 MM/HR (ref 0–15)
GLOBULIN SER CALC-MCNC: 2.9 G/DL (ref 2–4)
GLUCOSE SERPL-MCNC: 86 MG/DL (ref 65–100)
HCT VFR BLD AUTO: 41.9 % (ref 36.6–50.3)
HDLC SERPL-MCNC: 73 MG/DL
HDLC SERPL: 1.9 (ref 0–5)
HGB BLD-MCNC: 14.1 G/DL (ref 12.1–17)
IMM GRANULOCYTES # BLD AUTO: 0 K/UL (ref 0–0.04)
IMM GRANULOCYTES NFR BLD AUTO: 0 % (ref 0–0.5)
LDLC SERPL CALC-MCNC: 55.8 MG/DL (ref 0–100)
LYMPHOCYTES # BLD: 1.8 K/UL (ref 0.8–3.5)
LYMPHOCYTES NFR BLD: 29 % (ref 12–49)
MCH RBC QN AUTO: 29.7 PG (ref 26–34)
MCHC RBC AUTO-ENTMCNC: 33.7 G/DL (ref 30–36.5)
MCV RBC AUTO: 88.4 FL (ref 80–99)
MONOCYTES # BLD: 0.6 K/UL (ref 0–1)
MONOCYTES NFR BLD: 9 % (ref 5–13)
NEUTS SEG # BLD: 3.8 K/UL (ref 1.8–8)
NEUTS SEG NFR BLD: 60 % (ref 32–75)
NRBC # BLD: 0 K/UL (ref 0–0.01)
NRBC BLD-RTO: 0 PER 100 WBC
PLATELET # BLD AUTO: 259 K/UL (ref 150–400)
PMV BLD AUTO: 10.1 FL (ref 8.9–12.9)
POTASSIUM SERPL-SCNC: 4.8 MMOL/L (ref 3.5–5.1)
PROT SERPL-MCNC: 7.2 G/DL (ref 6.4–8.2)
RBC # BLD AUTO: 4.74 M/UL (ref 4.1–5.7)
SODIUM SERPL-SCNC: 136 MMOL/L (ref 136–145)
TRIGL SERPL-MCNC: 61 MG/DL
VLDLC SERPL CALC-MCNC: 12.2 MG/DL
WBC # BLD AUTO: 6.3 K/UL (ref 4.1–11.1)

## 2023-05-12 SDOH — ECONOMIC STABILITY: HOUSING INSECURITY
IN THE LAST 12 MONTHS, WAS THERE A TIME WHEN YOU DID NOT HAVE A STEADY PLACE TO SLEEP OR SLEPT IN A SHELTER (INCLUDING NOW)?: NO

## 2023-05-12 SDOH — ECONOMIC STABILITY: INCOME INSECURITY: HOW HARD IS IT FOR YOU TO PAY FOR THE VERY BASICS LIKE FOOD, HOUSING, MEDICAL CARE, AND HEATING?: NOT HARD AT ALL

## 2023-05-12 SDOH — ECONOMIC STABILITY: FOOD INSECURITY: WITHIN THE PAST 12 MONTHS, THE FOOD YOU BOUGHT JUST DIDN'T LAST AND YOU DIDN'T HAVE MONEY TO GET MORE.: NEVER TRUE

## 2023-05-12 SDOH — ECONOMIC STABILITY: FOOD INSECURITY: WITHIN THE PAST 12 MONTHS, YOU WORRIED THAT YOUR FOOD WOULD RUN OUT BEFORE YOU GOT MONEY TO BUY MORE.: NEVER TRUE

## 2023-05-12 ASSESSMENT — PATIENT HEALTH QUESTIONNAIRE - PHQ9
SUM OF ALL RESPONSES TO PHQ QUESTIONS 1-9: 0
2. FEELING DOWN, DEPRESSED OR HOPELESS: 0
SUM OF ALL RESPONSES TO PHQ9 QUESTIONS 1 & 2: 0
SUM OF ALL RESPONSES TO PHQ QUESTIONS 1-9: 0
1. LITTLE INTEREST OR PLEASURE IN DOING THINGS: 0
SUM OF ALL RESPONSES TO PHQ QUESTIONS 1-9: 0
SUM OF ALL RESPONSES TO PHQ QUESTIONS 1-9: 0

## 2023-05-12 NOTE — PROGRESS NOTES
ES Answers must have Comments  1. \"Have you been to the ER, urgent care clinic since your last visit? Hospitalized since your last visit? \"    [] YES   [x] NO       2. Have you seen or consulted any other health care providers outside of [address] [place] [address] since your last visit?     [] YES   [x] NO       3. For patients aged 39-70: Have you had a colorectal cancer screening such as a colonoscopy/FIT/Cologuard? Nurse/CMA to request records if not in chart   [] YES   [] NO   [x] NA, based on age    If the patient is female:      4. For female patients aged 41-77: Robynvalentin Medina you had a mammogram in the last two years?  Nurse/CMA to request records if not in chart   [] YES   [] NO   [x] NA, based on age    11. For female patients aged 21-65: Robyn Heir you had a pap smear?   Nurse/CMA to request records if not in chart   [] YES   [] NO  [x] NA, based on age    [facility]  Mr. Juliana Dobbins is a 34 y.o. male who is here for evaluation of the issues below:    Chief Complaint   Patient presents with    Sweats     Nighttime     Skin Problem     Bottom of Right Foot          HISTORY OF PRESENT ILLNESS    Presents generally feeling well, but with keratotic lesion on bottom of R foot for several years, occasionally tender. Off MTX for his RA for a couple of years, has had very few episodes of tender wrists or hand joints. Stopped marijuana a couple of months ago and has noted night time sweats without fever or pain. No weight loss, tachycardia or GI complaints. Working out regularly. Patient Active Problem List   Diagnosis    Rheumatoid arthritis involving both wrists with positive rheumatoid factor (HCC)       No Known Allergies    Current Outpatient Medications   Medication Sig Dispense Refill    Multiple Vitamin (MULTIVITAMIN PO) Take by mouth       No current facility-administered medications for this visit.         Social History     Tobacco Use    Smoking status: Former     Packs/day: 1.00     Types: Cigarettes

## 2024-07-02 ENCOUNTER — OFFICE VISIT (OUTPATIENT)
Dept: FAMILY MEDICINE CLINIC | Age: 34
End: 2024-07-02

## 2024-07-02 VITALS
BODY MASS INDEX: 23.16 KG/M2 | OXYGEN SATURATION: 98 % | WEIGHT: 156.38 LBS | HEART RATE: 81 BPM | SYSTOLIC BLOOD PRESSURE: 133 MMHG | HEIGHT: 69 IN | DIASTOLIC BLOOD PRESSURE: 74 MMHG | TEMPERATURE: 97.7 F | RESPIRATION RATE: 16 BRPM

## 2024-07-02 DIAGNOSIS — Z00.00 ENCOUNTER FOR HEALTH MAINTENANCE EXAMINATION IN ADULT: ICD-10-CM

## 2024-07-02 DIAGNOSIS — M05.732 RHEUMATOID ARTHRITIS INVOLVING BOTH WRISTS WITH POSITIVE RHEUMATOID FACTOR (HCC): Primary | ICD-10-CM

## 2024-07-02 DIAGNOSIS — Z11.4 ENCOUNTER FOR SCREENING FOR HIV: ICD-10-CM

## 2024-07-02 DIAGNOSIS — M05.731 RHEUMATOID ARTHRITIS INVOLVING BOTH WRISTS WITH POSITIVE RHEUMATOID FACTOR (HCC): Primary | ICD-10-CM

## 2024-07-02 SDOH — ECONOMIC STABILITY: FOOD INSECURITY: WITHIN THE PAST 12 MONTHS, THE FOOD YOU BOUGHT JUST DIDN'T LAST AND YOU DIDN'T HAVE MONEY TO GET MORE.: NEVER TRUE

## 2024-07-02 SDOH — ECONOMIC STABILITY: FOOD INSECURITY: WITHIN THE PAST 12 MONTHS, YOU WORRIED THAT YOUR FOOD WOULD RUN OUT BEFORE YOU GOT MONEY TO BUY MORE.: NEVER TRUE

## 2024-07-02 SDOH — ECONOMIC STABILITY: INCOME INSECURITY: HOW HARD IS IT FOR YOU TO PAY FOR THE VERY BASICS LIKE FOOD, HOUSING, MEDICAL CARE, AND HEATING?: NOT HARD AT ALL

## 2024-07-02 ASSESSMENT — PATIENT HEALTH QUESTIONNAIRE - PHQ9
SUM OF ALL RESPONSES TO PHQ QUESTIONS 1-9: 0
SUM OF ALL RESPONSES TO PHQ9 QUESTIONS 1 & 2: 0
SUM OF ALL RESPONSES TO PHQ QUESTIONS 1-9: 0
2. FEELING DOWN, DEPRESSED OR HOPELESS: NOT AT ALL
1. LITTLE INTEREST OR PLEASURE IN DOING THINGS: NOT AT ALL
SUM OF ALL RESPONSES TO PHQ QUESTIONS 1-9: 0
SUM OF ALL RESPONSES TO PHQ QUESTIONS 1-9: 0

## 2024-07-02 NOTE — PROGRESS NOTES
\"Have you been to the ER, urgent care clinic since your last visit?  Hospitalized since your last visit?\"    NO    “Have you seen or consulted any other health care providers outside of [facility] since your last visit?”    NO    7/2/2024      Chief Complaint   Patient presents with    Annual Exam         History of Present Illness:         is a 33 y.o. male for  exam and follow up of RA in remission. No joint sxs, feels well, still having occasional night sweats and since stopping all THC has noted that his HR is higher. Still doing nicotine gum. Lots of work stress, but mood and home life are good.        No Known Allergies    Current Outpatient Medications   Medication Sig Dispense Refill    Multiple Vitamin (MULTIVITAMIN PO) Take by mouth       No current facility-administered medications for this visit.             Physical Examination:    /74 (Site: Left Upper Arm, Position: Sitting, Cuff Size: Medium Adult)   Pulse 81   Temp 97.7 °F (36.5 °C) (Oral)   Resp 16   Ht 1.753 m (5' 9\")   Wt 70.9 kg (156 lb 6 oz)   SpO2 98%   BMI 23.09 kg/m²    General:  Alert, cooperative, no distress.   HEENT:  Normocephalic, without obvious abnormality, atraumatic.Conjunctivae/corneas clear. Pupils equal, round, reactive to light. Extraocular movements intact.TMs and external canals normal bilaterally. Nasal mucosa and oropharynx clear.   Lungs: Clear to auscultation bilaterally.   Chest wall:  No tenderness or deformity.   Heart:  Regular rate and rhythm, S1, S2 normal, no murmur, click, rub, or gallop.   Abdomen:   Soft, non-tender. Bowel sounds normal. No masses. No organomegaly.   Extremities: Extremities normal, atraumatic, no cyanosis or edema.   Pulses: 2+ and symmetric all extremities.   Skin: Skin color, texture, turgor normal. No rashes or lesions.   Lymph nodes: Cervical, supraclavicular, and axillary nodes normal.   Neurologic: CNII-XII intact. Normal strength, sensation, and

## 2024-07-03 LAB
ALBUMIN SERPL-MCNC: 3.9 G/DL (ref 3.5–5)
ALBUMIN/GLOB SERPL: 1.3 (ref 1.1–2.2)
ALP SERPL-CCNC: 58 U/L (ref 45–117)
ALT SERPL-CCNC: 29 U/L (ref 12–78)
ANION GAP SERPL CALC-SCNC: 2 MMOL/L (ref 5–15)
AST SERPL-CCNC: 19 U/L (ref 15–37)
BASOPHILS # BLD: 0.1 K/UL (ref 0–0.1)
BASOPHILS NFR BLD: 1 % (ref 0–1)
BILIRUB SERPL-MCNC: 0.4 MG/DL (ref 0.2–1)
BUN SERPL-MCNC: 20 MG/DL (ref 6–20)
BUN/CREAT SERPL: 17 (ref 12–20)
CALCIUM SERPL-MCNC: 8.9 MG/DL (ref 8.5–10.1)
CHLORIDE SERPL-SCNC: 108 MMOL/L (ref 97–108)
CO2 SERPL-SCNC: 28 MMOL/L (ref 21–32)
CREAT SERPL-MCNC: 1.15 MG/DL (ref 0.7–1.3)
DIFFERENTIAL METHOD BLD: NORMAL
EOSINOPHIL # BLD: 0.1 K/UL (ref 0–0.4)
EOSINOPHIL NFR BLD: 1 % (ref 0–7)
ERYTHROCYTE [DISTWIDTH] IN BLOOD BY AUTOMATED COUNT: 13.2 % (ref 11.5–14.5)
ERYTHROCYTE [SEDIMENTATION RATE] IN BLOOD: 6 MM/HR (ref 0–15)
GLOBULIN SER CALC-MCNC: 2.9 G/DL (ref 2–4)
GLUCOSE SERPL-MCNC: 92 MG/DL (ref 65–100)
HCT VFR BLD AUTO: 39.3 % (ref 36.6–50.3)
HGB BLD-MCNC: 13.6 G/DL (ref 12.1–17)
HIV 1+2 AB+HIV1 P24 AG SERPL QL IA: NONREACTIVE
HIV 1/2 RESULT COMMENT: NORMAL
IMM GRANULOCYTES # BLD AUTO: 0 K/UL (ref 0–0.04)
IMM GRANULOCYTES NFR BLD AUTO: 0 % (ref 0–0.5)
LYMPHOCYTES # BLD: 1.8 K/UL (ref 0.8–3.5)
LYMPHOCYTES NFR BLD: 31 % (ref 12–49)
MCH RBC QN AUTO: 30.2 PG (ref 26–34)
MCHC RBC AUTO-ENTMCNC: 34.6 G/DL (ref 30–36.5)
MCV RBC AUTO: 87.3 FL (ref 80–99)
MONOCYTES # BLD: 0.6 K/UL (ref 0–1)
MONOCYTES NFR BLD: 10 % (ref 5–13)
NEUTS SEG # BLD: 3.2 K/UL (ref 1.8–8)
NEUTS SEG NFR BLD: 57 % (ref 32–75)
NRBC # BLD: 0 K/UL (ref 0–0.01)
NRBC BLD-RTO: 0 PER 100 WBC
PLATELET # BLD AUTO: 250 K/UL (ref 150–400)
PMV BLD AUTO: 9.8 FL (ref 8.9–12.9)
POTASSIUM SERPL-SCNC: 4.6 MMOL/L (ref 3.5–5.1)
PROT SERPL-MCNC: 6.8 G/DL (ref 6.4–8.2)
RBC # BLD AUTO: 4.5 M/UL (ref 4.1–5.7)
SODIUM SERPL-SCNC: 138 MMOL/L (ref 136–145)
WBC # BLD AUTO: 5.7 K/UL (ref 4.1–11.1)